# Patient Record
Sex: FEMALE | Race: WHITE | NOT HISPANIC OR LATINO | Employment: UNEMPLOYED | URBAN - METROPOLITAN AREA
[De-identification: names, ages, dates, MRNs, and addresses within clinical notes are randomized per-mention and may not be internally consistent; named-entity substitution may affect disease eponyms.]

---

## 2019-12-10 LAB
LV EF: 78 %
LVEF MODALITY: NORMAL

## 2023-08-23 ENCOUNTER — HOSPITAL ENCOUNTER (OUTPATIENT)
Dept: DATA CONVERSION | Facility: HOSPITAL | Age: 68
End: 2023-08-23
Attending: SURGERY | Admitting: SURGERY
Payer: COMMERCIAL

## 2023-08-23 DIAGNOSIS — Z88.2 ALLERGY STATUS TO SULFONAMIDES: ICD-10-CM

## 2023-08-23 DIAGNOSIS — D12.3 BENIGN NEOPLASM OF TRANSVERSE COLON: ICD-10-CM

## 2023-08-23 DIAGNOSIS — Z88.0 ALLERGY STATUS TO PENICILLIN: ICD-10-CM

## 2023-08-23 DIAGNOSIS — K64.2 THIRD DEGREE HEMORRHOIDS: ICD-10-CM

## 2023-08-23 DIAGNOSIS — Z12.11 ENCOUNTER FOR SCREENING FOR MALIGNANT NEOPLASM OF COLON: ICD-10-CM

## 2023-08-23 DIAGNOSIS — K57.30 DIVERTICULOSIS OF LARGE INTESTINE WITHOUT PERFORATION OR ABSCESS WITHOUT BLEEDING: ICD-10-CM

## 2023-08-23 DIAGNOSIS — D12.6 BENIGN NEOPLASM OF COLON, UNSPECIFIED: ICD-10-CM

## 2023-08-23 DIAGNOSIS — K62.1 RECTAL POLYP: ICD-10-CM

## 2023-08-23 DIAGNOSIS — Z86.010 PERSONAL HISTORY OF COLONIC POLYPS: ICD-10-CM

## 2023-08-25 LAB
ATRIAL RATE: 69 BPM
P AXIS: 50 DEGREES
P OFFSET: 153 MS
P ONSET: 130 MS
PR INTERVAL: 230 MS
Q ONSET: 220 MS
QRS COUNT: 12 BEATS
QRS DURATION: 86 MS
QT INTERVAL: 418 MS
QTC CALCULATION(BAZETT): 447 MS
QTC FREDERICIA: 438 MS
R AXIS: 21 DEGREES
T AXIS: 37 DEGREES
T OFFSET: 429 MS
VENTRICULAR RATE: 69 BPM

## 2023-08-30 LAB
COMPLETE PATHOLOGY REPORT: NORMAL
CONVERTED CLINICAL DIAGNOSIS-HISTORY: NORMAL
CONVERTED FINAL DIAGNOSIS: NORMAL
CONVERTED FINAL REPORT PDF LINK TO COPY AND PASTE: NORMAL
CONVERTED GROSS DESCRIPTION: NORMAL

## 2023-09-30 NOTE — H&P
History of Present Illness:   History Present Illness:  Reason for surgery: history of tubular adenoma   HPI:    One tubular adenoma removed in 2016    Allergies:        Allergies:  ·  penicillin : Rash  ·  vancomycin : Unknown  ·  sulfa  drugs: Rash    Home Medication Review:   Home Medications Reviewed: yes     Impression/Procedure:   ·  Impression and Planned Procedure: colonoscopy       ERAS (Enhanced Recovery After Surgery):  ·  ERAS Patient: no       Physical Exam by System:    Constitutional: Well developed, awake/alert/oriented  x3, no distress, alert and cooperative   Respiratory/Thorax: Patent airways, CTAB, normal  breath sounds with good chest expansion, thorax symmetric   Cardiovascular: Regular, rate and rhythm, no murmurs,  2+ equal pulses of the extremities, normal S 1and S 2   Gastrointestinal: Nondistended, soft, non-tender,  no rebound tenderness or guarding, no masses palpable, no organomegaly   Musculoskeletal: ROM intact, no joint swelling, normal  strength   Extremities: normal extremities, no cyanosis edema,  contusions or wounds, no clubbing     Consent:   COVID-19 Consent:  ·  COVID-19 Risk Consent Surgeon has reviewed key risks related to the risk of pk COVID-19 and if they contract COVID-19 what the risks are.       Electronic Signatures:  Jeffrey Harding)  (Signed 23-Aug-2023 13:16)   Authored: History of Present Illness, Allergies, Home  Medication Review, Impression/Procedure, ERAS, Physical Exam, Consent, Note Completion      Last Updated: 23-Aug-2023 13:16 by Jeffrey Harding)

## 2023-11-01 ENCOUNTER — HOSPITAL ENCOUNTER (OUTPATIENT)
Dept: CARDIOLOGY | Facility: HOSPITAL | Age: 68
Discharge: HOME | End: 2023-11-01
Payer: COMMERCIAL

## 2023-11-01 DIAGNOSIS — Z95.0 PACEMAKER: ICD-10-CM

## 2023-11-01 DIAGNOSIS — I49.5 SICK SINUS SYNDROME (MULTI): ICD-10-CM

## 2023-11-01 DIAGNOSIS — Z95.0 PACEMAKER: Primary | ICD-10-CM

## 2023-11-01 PROCEDURE — 93296 REM INTERROG EVL PM/IDS: CPT

## 2023-12-05 PROCEDURE — 93294 REM INTERROG EVL PM/LDLS PM: CPT | Performed by: INTERNAL MEDICINE

## 2023-12-18 PROBLEM — E66.9 OBESITY: Status: ACTIVE | Noted: 2023-01-24

## 2023-12-18 PROBLEM — Z95.0 PRESENCE OF CARDIAC PACEMAKER: Status: ACTIVE | Noted: 2022-06-21

## 2023-12-18 PROBLEM — I10 ESSENTIAL HYPERTENSION: Status: ACTIVE | Noted: 2023-05-02

## 2023-12-18 PROBLEM — G47.33 OBSTRUCTIVE SLEEP APNEA SYNDROME: Status: ACTIVE | Noted: 2023-01-24

## 2023-12-18 PROBLEM — I49.5 SINUS NODE DYSFUNCTION (MULTI): Status: ACTIVE | Noted: 2023-12-18

## 2023-12-18 PROBLEM — K21.9 HIATAL HERNIA WITH GERD: Status: ACTIVE | Noted: 2023-12-18

## 2023-12-18 PROBLEM — R00.2 PALPITATIONS: Status: ACTIVE | Noted: 2022-06-15

## 2023-12-18 PROBLEM — Z86.79 HISTORY OF CARDIAC ARRHYTHMIA: Status: ACTIVE | Noted: 2023-01-24

## 2023-12-18 PROBLEM — J44.9 CHRONIC OBSTRUCTIVE PULMONARY DISEASE (MULTI): Status: ACTIVE | Noted: 2023-05-02

## 2023-12-18 PROBLEM — E78.5 HYPERLIPIDEMIA: Status: ACTIVE | Noted: 2022-06-15

## 2023-12-18 PROBLEM — K44.9 HIATAL HERNIA WITH GERD: Status: ACTIVE | Noted: 2023-12-18

## 2023-12-18 PROBLEM — K57.30 DIVERTICULOSIS OF LARGE INTESTINE WITHOUT HEMORRHAGE: Status: ACTIVE | Noted: 2023-12-18

## 2023-12-18 PROBLEM — R06.02 SHORTNESS OF BREATH AT REST: Status: ACTIVE | Noted: 2023-01-24

## 2023-12-18 PROBLEM — I47.10 SVT (SUPRAVENTRICULAR TACHYCARDIA) (CMS-HCC): Status: ACTIVE | Noted: 2023-12-18

## 2023-12-18 RX ORDER — ATORVASTATIN CALCIUM 10 MG/1
10 TABLET, FILM COATED ORAL DAILY
COMMUNITY

## 2023-12-18 RX ORDER — METOPROLOL SUCCINATE 50 MG/1
50 TABLET, EXTENDED RELEASE ORAL DAILY
COMMUNITY

## 2023-12-18 RX ORDER — AMLODIPINE BESYLATE 5 MG/1
5 TABLET ORAL DAILY
COMMUNITY

## 2023-12-18 RX ORDER — ALBUTEROL SULFATE 90 UG/1
AEROSOL, METERED RESPIRATORY (INHALATION)
COMMUNITY
Start: 2017-02-09

## 2023-12-18 RX ORDER — NAPROXEN SODIUM 220 MG
220 TABLET ORAL EVERY 12 HOURS PRN
COMMUNITY
Start: 2018-09-24

## 2023-12-18 RX ORDER — PANTOPRAZOLE SODIUM 40 MG/1
40 TABLET, DELAYED RELEASE ORAL DAILY
COMMUNITY

## 2023-12-18 RX ORDER — LOSARTAN POTASSIUM 50 MG/1
50 TABLET ORAL DAILY
COMMUNITY

## 2023-12-27 ENCOUNTER — HOSPITAL ENCOUNTER (OUTPATIENT)
Dept: CARDIOLOGY | Facility: HOSPITAL | Age: 68
Discharge: HOME | End: 2023-12-27
Payer: COMMERCIAL

## 2023-12-27 ENCOUNTER — OFFICE VISIT (OUTPATIENT)
Dept: CARDIOLOGY | Facility: CLINIC | Age: 68
End: 2023-12-27
Payer: COMMERCIAL

## 2023-12-27 VITALS
HEIGHT: 62 IN | BODY MASS INDEX: 38.09 KG/M2 | WEIGHT: 207 LBS | HEART RATE: 67 BPM | SYSTOLIC BLOOD PRESSURE: 128 MMHG | DIASTOLIC BLOOD PRESSURE: 75 MMHG

## 2023-12-27 DIAGNOSIS — Z95.0 PACEMAKER: ICD-10-CM

## 2023-12-27 DIAGNOSIS — R06.02 SHORTNESS OF BREATH: Primary | ICD-10-CM

## 2023-12-27 DIAGNOSIS — Z95.810 PRESENCE OF AUTOMATIC (IMPLANTABLE) CARDIAC DEFIBRILLATOR: ICD-10-CM

## 2023-12-27 DIAGNOSIS — I49.5 SICK SINUS SYNDROME (MULTI): ICD-10-CM

## 2023-12-27 DIAGNOSIS — Z87.891 FORMER SMOKER: ICD-10-CM

## 2023-12-27 DIAGNOSIS — R00.2 PALPITATIONS: ICD-10-CM

## 2023-12-27 PROCEDURE — 99215 OFFICE O/P EST HI 40 MIN: CPT | Performed by: INTERNAL MEDICINE

## 2023-12-27 PROCEDURE — 3078F DIAST BP <80 MM HG: CPT | Performed by: INTERNAL MEDICINE

## 2023-12-27 PROCEDURE — 1126F AMNT PAIN NOTED NONE PRSNT: CPT | Performed by: INTERNAL MEDICINE

## 2023-12-27 PROCEDURE — 93280 PM DEVICE PROGR EVAL DUAL: CPT | Performed by: INTERNAL MEDICINE

## 2023-12-27 PROCEDURE — 1159F MED LIST DOCD IN RCRD: CPT | Performed by: INTERNAL MEDICINE

## 2023-12-27 PROCEDURE — 93280 PM DEVICE PROGR EVAL DUAL: CPT

## 2023-12-27 PROCEDURE — 1036F TOBACCO NON-USER: CPT | Performed by: INTERNAL MEDICINE

## 2023-12-27 PROCEDURE — 93290 INTERROG DEV EVAL ICPMS IP: CPT | Performed by: INTERNAL MEDICINE

## 2023-12-27 PROCEDURE — 93000 ELECTROCARDIOGRAM COMPLETE: CPT | Performed by: INTERNAL MEDICINE

## 2023-12-27 PROCEDURE — 3008F BODY MASS INDEX DOCD: CPT | Performed by: INTERNAL MEDICINE

## 2023-12-27 PROCEDURE — 3074F SYST BP LT 130 MM HG: CPT | Performed by: INTERNAL MEDICINE

## 2023-12-27 ASSESSMENT — ENCOUNTER SYMPTOMS
DYSPNEA ON EXERTION: 1
PALPITATIONS: 0

## 2023-12-27 NOTE — PATIENT INSTRUCTIONS
Continue same medications/treatment.  Patient educated on proper medication use.  Patient educated on risk factor modification.  Please bring any lab results from other providers/physicians to your next appointment.    Please bring all medicines, vitamins, and herbal supplements with you when you come to the office.    Prescriptions will not be filled unless you are compliant with your follow up appointments or have a follow up appointment scheduled as per instruction of your physician. Refills should be requested at the time of your visit.    Schedule ECHO  Follow up with Maria L in 6 months with device check  Continue remote checks at 3 and 9 months    EILEEN CORONEL RN, AM SCRIBING FOR, AND IN THE PRESENCE OF DR. MELANIE WILLIS MD

## 2023-12-27 NOTE — PROGRESS NOTES
CARDIOLOGY OFFICE VISIT      CHIEF COMPLAINT  Chief Complaint   Patient presents with    Follow-up     Over due       HISTORY OF PRESENT ILLNESS  HPI    68-year-old  female who is followed for sinus node dysfunction status post dual-chamber pacemaker implant on November 8, 2019. : Lexiscan stress test dated Anabelle 15, 2022 revealed an ejection fraction of 61% with no acute ischemic changes or infarct patterns noted    Patient lost completely follow-up since 2022.  Since then she states that she was doing well until a couple of months ago when she started noticing shortness of breath with moderate activities.  Breathing improved significantly for the last few weeks.    EKG performed today shows atrial paced rhythm at a rate of 67 bpm QRS duration 86 ms QT corrected 338 ms.  Rhythm strip shows the same pattern.    Patient had a device interrogation performed today at the device clinic and shows adequate sensing, capture and impedances of all leads.  Battery longevity 9 years 10 months.  [place] of atrial fibrillation less than 0.1% of the time with the longest duration 1 hour on January 11, 2022.  No ventricular events noted.        Past Medical History  Past Medical History:   Diagnosis Date    Acute vaginitis     Bacterial vaginosis    Acute vulvitis 08/27/2020    Vulvitis    Age-related osteoporosis with current pathological fracture, unspecified site, subsequent encounter for fracture with routine healing 06/02/2022    Age-related osteoporosis with current pathological fracture with routine healing, subsequent encounter    Atypical squamous cells of undetermined significance on cytologic smear of cervix (ASC-US)     ASCUS with positive high risk HPV cervical    Candidiasis of skin and nail 05/25/2021    Candidiasis, cutaneous    Cervical high risk human papillomavirus (HPV) DNA test positive 05/12/2019    Cervical high risk HPV (human papillomavirus) test positive    Circadian rhythm sleep disorder,  unspecified type     Sleep pattern disturbance    Cutaneous abscess of limb, unspecified 07/10/2020    Abscess, axilla    Elevated blood-pressure reading, without diagnosis of hypertension     Elevated blood pressure reading    Encounter for follow-up examination after completed treatment for conditions other than malignant neoplasm 05/12/2019    Postop check    Encounter for follow-up examination after completed treatment for conditions other than malignant neoplasm 05/12/2019    Postoperative examination    Encounter for general adult medical examination without abnormal findings     Health care maintenance    Encounter for gynecological examination (general) (routine) with abnormal findings     Abnormal gynecological examination    Encounter for gynecological examination (general) (routine) without abnormal findings     Encounter for routine gynecological examination with Papanicolaou smear of cervix    Encounter for other specified aftercare 10/29/2018    Visit for wound check    Encounter for screening for human papillomavirus (HPV)     Screening for human papillomavirus (HPV)    Encounter for screening for infections with a predominantly sexual mode of transmission 05/07/2019    Screen for STD (sexually transmitted disease)    Encounter for screening for lipoid disorders     Encounter for screening for lipoid disorders    Encounter for screening for malignant neoplasm of cervix     Cervical cancer screening    Encounter for screening for malignant neoplasm of colon 10/24/2016    Encounter for screening colonoscopy    Lichen simplex chronicus     Lichen simplex    Low grade squamous intraepithelial lesion on cytologic smear of cervix (LGSIL) 05/12/2019    LGSIL on Pap smear of cervix    Lower abdominal pain, unspecified 02/12/2021    Lower abdominal pain    Osteoarthritis of knee, unspecified     Osteoarthritis of knee    Other diseases of tongue 06/01/2020    Lesion of tongue    Other hypertrophic disorders of  the skin 10/08/2021    Skin tag    Other specified noninflammatory disorders of vagina     Vaginal odor    Personal history of cervical dysplasia 05/12/2019    History of cervical dysplasia    Personal history of diseases of the skin and subcutaneous tissue     History of cellulitis    Personal history of other (healed) physical injury and trauma     History of dislocation of ankle    Personal history of other benign neoplasm 11/21/2016    History of other benign neoplasm    Personal history of other benign neoplasm 10/01/2018    History of other benign neoplasm    Personal history of other diseases of the circulatory system     History of cardiac arrhythmia    Personal history of other diseases of the circulatory system 06/02/2022    History of hypertension    Personal history of other diseases of the circulatory system 06/02/2022    History of atrial fibrillation    Personal history of other diseases of the digestive system     History of hiatal hernia    Personal history of other diseases of the female genital tract     History of vaginal discharge    Personal history of other diseases of the female genital tract 07/23/2020    History of postmenopausal bleeding    Personal history of other diseases of the nervous system and sense organs 05/25/2021    History of tension headache    Personal history of other endocrine, nutritional and metabolic disease     History of hypokalemia    Personal history of other endocrine, nutritional and metabolic disease 02/12/2021    History of hypercholesterolemia    Personal history of other endocrine, nutritional and metabolic disease     History of obesity    Personal history of other infectious and parasitic diseases     History of candidiasis    Personal history of other medical treatment     History of screening mammography    Personal history of other specified conditions 01/10/2022    History of urinary frequency    Personal history of other specified conditions     History  of diarrhea    Personal history of other specified conditions     History of headache    Personal history of other specified conditions     History of dizziness    Personal history of other specified conditions 10/24/2016    History of heartburn    Personal history of other specified conditions 09/21/2021    History of fever    Personal history of other specified conditions     History of chest pain    Personal history of other specified conditions     History of syncope    Personal history of urinary calculi     History of kidney stones    Postmenopausal atrophic vaginitis     Postmenopausal atrophic vaginitis    Presence of other cardiac implants and grafts     Status post placement of implantable loop recorder    Pruritus vulvae     Vulvar itching    Unspecified hearing loss, unspecified ear     Difficulty hearing    Unspecified osteoarthritis, unspecified site     Osteoarthrosis    Urinary tract infection, site not specified 01/10/2022    Urinary tract infection, acute       Social History  Social History     Tobacco Use    Smoking status: Former     Types: Cigarettes    Smokeless tobacco: Never   Substance Use Topics    Alcohol use: Yes     Comment: rare    Drug use: Never       Family History     Family History   Problem Relation Name Age of Onset    Stroke Mother      Coronary artery disease Father      Stroke Father      Diabetes Father      Prostate cancer Father      Cancer Sister          Allergies:  Allergies   Allergen Reactions    Sulfa (Sulfonamide Antibiotics) Anaphylaxis and Rash    Vancomycin Itching and Other     Itching.    Penicillins Rash        Outpatient Medications:  Current Outpatient Medications   Medication Instructions    albuterol 90 mcg/actuation inhaler inhalation    amLODIPine (NORVASC) 5 mg, oral, Daily    atorvastatin (LIPITOR) 10 mg, oral, Daily    losartan (COZAAR) 50 mg, oral, Daily    metoprolol succinate XL (TOPROL-XL) 50 mg, oral, Daily    naproxen sodium (ALEVE) 220 mg,  oral, Every 12 hours PRN    pantoprazole (PROTONIX) 40 mg, oral, Daily          REVIEW OF SYSTEMS  Review of Systems   Cardiovascular:  Positive for dyspnea on exertion. Negative for chest pain and palpitations.   All other systems reviewed and are negative.        VITALS  Vitals:    12/27/23 1544   BP: 128/75   Pulse: 67       PHYSICAL EXAM  Constitutional:       General: Awake.      Appearance: Normal and healthy appearance. Well-developed and not in distress.   Neck:      Vascular: No JVR. JVD normal.   Pulmonary:      Effort: Pulmonary effort is normal.      Breath sounds: Normal breath sounds. No wheezing. No rhonchi. No rales.   Chest:      Chest wall: Not tender to palpatation.      Comments: Left sided device pocket- healed and well approximated. No lump or hematoma     Cardiovascular:      PMI at left midclavicular line. Normal rate. Regular rhythm. Normal S1. Normal S2.       Murmurs: There is no murmur.      No gallop.  No click. No rub.   Pulses:     Intact distal pulses.   Edema:     Peripheral edema absent.   Abdominal:      Tenderness: There is no abdominal tenderness.   Musculoskeletal: Normal range of motion.         General: No tenderness. Skin:     General: Skin is warm and dry.   Neurological:      General: No focal deficit present.      Mental Status: Alert and oriented to person, place and time.           ASSESSMENT AND PLAN    Clinical impressions:  1. Sinus node dysfunction status post dual-chamber pacemaker implant ([employer]zn[employer] XT DR MRI) on November 8, 2019.  2. Palpitations with diagnostic EP study dated September 4, 2015 revealing normal conduction and no inducible arrhythmias with Isuprel provocation, nonsustained ventricular tachycardia up to 7 beats in duration Per Carl of [employer] monitor dated February 23, 2017 on beta-blockade with no documented ventricular arrhythmias per device interrogation.  3. Hypertension, controlled .  4. Structurally normal heart with normal left  ventricular function per 2D echocardiogram dated May 4, 2015 a stress echo dated March 8, 2017 and Lexiscan stress test dated Anabelle 15, 2022.  5. Dyslipidemia on statin.  6. Class II obesity with a BMI of 36.95.  7. Chronic obstructive pulmonary disease followed by Dr. Tena.    Plan-recommendations    Will obtain an echocardiogram for evaluation of the left ventricular ejection fraction.    Continue with beta-blocker therapy.    Follow-up device clinic as scheduled.    Follow my office every 6 months or sooner needed.    Will continue watching the burden of atrial fibrillation by device interrogation.    Risk factor modification and lifestyle modification discussed with patient. Diet , exercise and hydration discussed with patient.    I have personally review with patient during this office visit, laboratory data, echocardiogram results, stress test results, Holter-event monitor results prior and after the last electrophysiology visit. All questions has been answered.    Please excuse any errors in grammar or translation related to this dictation.  Voice recognition software was utilized to prepare this document.

## 2024-04-03 ENCOUNTER — HOSPITAL ENCOUNTER (OUTPATIENT)
Dept: CARDIOLOGY | Facility: HOSPITAL | Age: 69
Discharge: HOME | End: 2024-04-03
Payer: COMMERCIAL

## 2024-04-03 DIAGNOSIS — Z95.0 PACEMAKER: ICD-10-CM

## 2024-04-03 DIAGNOSIS — I49.5 SICK SINUS SYNDROME (MULTI): ICD-10-CM

## 2024-04-03 PROCEDURE — 93294 REM INTERROG EVL PM/LDLS PM: CPT | Performed by: INTERNAL MEDICINE

## 2024-04-03 PROCEDURE — 93296 REM INTERROG EVL PM/IDS: CPT
